# Patient Record
Sex: FEMALE | Race: WHITE | Employment: FULL TIME | URBAN - METROPOLITAN AREA
[De-identification: names, ages, dates, MRNs, and addresses within clinical notes are randomized per-mention and may not be internally consistent; named-entity substitution may affect disease eponyms.]

---

## 2017-02-14 ENCOUNTER — PATIENT OUTREACH (OUTPATIENT)
Dept: INTERNAL MEDICINE CLINIC | Age: 37
End: 2017-02-14

## 2017-02-15 ENCOUNTER — PATIENT OUTREACH (OUTPATIENT)
Dept: INTERNAL MEDICINE CLINIC | Age: 37
End: 2017-02-15

## 2017-02-15 NOTE — PROGRESS NOTES
NNTOCIP  Nurse Navigator second attempt to contact patient post hospitalization from [facility]; 2/4/2017 to 2/8/2017 for c/o vaginal bleeding. Left message for the patient to contact the office on the answering machine; letter sent.

## 2017-02-15 NOTE — LETTER
2/15/2017 9:28 AM 
 
Ms. Larry Starr [address] [facility] [address] I am a Nurse Navigator working with your physician's office. Part of my job is to follow up with patients who have been in the emergency department or hospital to see how they are feeling, answer any questions they may have about their visit and also make sure they have a follow-up appointment to see their primary care doctor. I can also provide resources to patients that have other needs. Please call me if you need assistance with affording food, medications, or if you need transportation to physician appointments. I can be reached Monday thru Friday at the telephone number listed above. Thank you for allowing us to participate in your care!  
 
 
 
 
Sincerely, 
 
 
Jeff Kaminski RN

## 2017-03-16 ENCOUNTER — PATIENT OUTREACH (OUTPATIENT)
Dept: INTERNAL MEDICINE CLINIC | Age: 37
End: 2017-03-16

## 2017-04-12 ENCOUNTER — OFFICE VISIT (OUTPATIENT)
Dept: SURGERY | Age: 37
End: 2017-04-12

## 2017-04-12 VITALS
DIASTOLIC BLOOD PRESSURE: 80 MMHG | HEART RATE: 84 BPM | SYSTOLIC BLOOD PRESSURE: 130 MMHG | BODY MASS INDEX: 36.99 KG/M2 | TEMPERATURE: 97.7 F | WEIGHT: 222 LBS | HEIGHT: 65 IN

## 2017-04-12 DIAGNOSIS — I10 ESSENTIAL HYPERTENSION: ICD-10-CM

## 2017-04-12 DIAGNOSIS — Z98.84 LAP-BAND SURGERY STATUS: ICD-10-CM

## 2017-04-12 RX ORDER — LABETALOL 200 MG/1
TABLET, FILM COATED ORAL 2 TIMES DAILY
COMMUNITY
End: 2017-05-26 | Stop reason: ALTCHOICE

## 2017-04-12 NOTE — MR AVS SNAPSHOT
Visit Information Date & Time Provider Department Dept. Phone Encounter #  
 4/12/2017 10:15 AM Marco Vera  [facility] [phone] [id number] Upcoming Health Maintenance Date Due  
 PAP AKA CERVICAL CYTOLOGY 1/14/2000 DTaP/Tdap/Td series (1 - Tdap) 8/10/2005 INFLUENZA AGE 9 TO ADULT 8/1/2016 Allergies as of 4/12/2017  Review Complete On: 7/15/2016 By: Jessica Platt LPN Severity Noted Reaction Type Reactions Latex  01/03/2012    Rash Adhesive  06/27/2014    Rash  
 Sulfa (Sulfonamide Antibiotics)    Unable to Obtain Current Immunizations  Reviewed on 9/1/2014 Name Date PPD 9/18/2000 TD Vaccine 8/9/2005 Not reviewed this visit Vitals BP Pulse Temp Height(growth percentile) Weight(growth percentile) BMI  
 130/80 84 97.7 °F (36.5 °C) 5' 5\" (1.651 m) 222 lb (100.7 kg) 36.94 kg/m2 OB Status Smoking Status Having regular periods Never Smoker BMI and BSA Data Body Mass Index Body Surface Area  
 36.94 kg/m 2 2.15 m 2 Preferred Pharmacy Pharmacy Name Phone [address] [place] [address] [address] [facility] [phone] Your Updated Medication List  
  
   
This list is accurate as of: 4/12/17 11:30 AM.  Always use your most recent med list.  
  
  
  
  
 ferrous sulfate 325 mg (65 mg iron) tablet Take  by mouth Daily (before breakfast). folic acid 936 mcg tablet Take 400 mcg by mouth daily. levothyroxine 112 mcg tablet Commonly known as:  SYNTHROID Take 1 tablet by mouth Daily (before breakfast). MULTIVITAMIN PO Take  by mouth. VITAMIN D3 PO Take  by mouth. Introducing [facility]! [address] [facility] introduces Caremerge patient portal. Now you can access parts of your medical record, email your doctor's office, and request medication refills online. 1. In your internet browser, go to [email or web address] 2. Click on the First Time User? Click Here link in the Sign In box. You will see the New Member Sign Up page. 3. Enter your Enevo Access Code exactly as it appears below. You will not need to use this code after youve completed the sign-up process. If you do not sign up before the expiration date, you must request a new code. · Enevo Access Code: [email or web address] Expires: 7/11/2017 10:19 AM 
 
4. Enter the last four digits of your Social Security Number (xxxx) and Date of Birth (mm/dd/yyyy) as indicated and click Submit. You will be taken to the next sign-up page. 5. Create a Enevo ID. This will be your Enevo login ID and cannot be changed, so think of one that is secure and easy to remember. 6. Create a Enevo password. You can change your password at any time. 7. Enter your Password Reset Question and Answer. This can be used at a later time if you forget your password. 8. Enter your e-mail address. You will receive e-mail notification when new information is available in [address] 9. Click Sign Up. You can now view and download portions of your medical record. 10. Click the Download Summary menu link to download a portable copy of your medical information. If you have questions, please visit the Frequently Asked Questions section of the Enevo website. Remember, Enevo is NOT to be used for urgent needs. For medical emergencies, dial 911. Now available from your iPhone and Android! Please provide this summary of care documentation to your next provider. Your primary care clinician is listed as Joe Gross. If you have any questions after today's visit, please call [phone].

## 2017-04-12 NOTE — PROGRESS NOTES
Patient presents for a band tighting of her 2009 lap band. She recently had a child via . Body mass index is 36.94 kg/(m^2).

## 2017-04-12 NOTE — PROGRESS NOTES
Vanessa Boyle is a 44 y.o. female is now 7 years status post laparoscopic adjustable gastric band surgery. Doing well overall. Currently on a solid meats/veggies diet without difficulty. She is compliant with multivitamins, calcium, Vit D and B12 supplements. She recently delivered healthy twins and has regained significant weight, exacerbating her HTN. Early in her pregnancy she was experiencing heartburn and had band completely deflated in order to reduce pressure on LES. She is now ready to have band re-inflated to the point where she was prior to pregnancy as she was losing weight and feeling adequate restriction. 4.1ccwas removed at time of deflation on 7/15  She has regained       Weight Loss Metrics 4/12/2017 7/15/2016 11/20/2015 9/2/2014 8/22/2014 6/27/2014 8/3/2012   Pre op / Initial Wt - - - - - - 240   Today's Wt 222 lb 173 lb 169 lb 197 lb 8 oz 194 lb 198 lb -   BMI 36.94 kg/m2 28.79 kg/m2 27.92 kg/m2 32.63 kg/m2 32.28 kg/m2 32.95 kg/m2 -   Ideal Body Wt - - - - - - 134   Excess Body Wt - - - - - - 106   Goal Wt - - - - - - 155   Wt loss to date - - - - - - 49   % Wt Loss - - - - - - 0.58   80% EBW - - - - - - 84.8       Body mass index is 36.94 kg/(m^2).       Comorbidities:    Hypertension: worsened  Diabetes: not applicable  Obstructive Sleep Apnea: not applicable  Hyperlipidemia: not applicable  Stress Urinary Incontinence: not applicable  Gastroesophageal Reflux: not applicable  Weight related arthropathy:not applicable        Past Medical History:   Diagnosis Date    Dyslipidemia     FHx: heart disease     Goiter 7/11    negative biopsy Dr Jaspal Fregoso Hypertension     Hypothyroidism     Dr Jenifer Howard in past    IFG (impaired fasting glucose) 9/1/2014    Iron deficiency anemia     from menorrhagia s/p iv fe multiple times Dr Kitty Brown obesity [facility])     s/p lap band 9/10 Dr Mariana Vazquez Polycystic ovary syndrome        Past Surgical History:   Procedure Laterality Date    ADJUSTMENT GASTRIC BAND  2009    Dr Sharmila Rangel, peak weight 251, renae 171    HX APPENDECTOMY      HX BREAST AUGMENTATION      HX CHOLECYSTECTOMY      HX DILATION AND CURETTAGE      HX DILATION AND CURETTAGE      HX GI  09/09/2010    Barium swallow, upper gastrointestinal    HX GI  06/28/2010    Lap Band placement    HX GYN      failed IVF x2 Dr Blayne Shelley       Current Outpatient Prescriptions   Medication Sig Dispense Refill    labetalol (NORMODYNE) 200 mg tablet Take  by mouth two (2) times a day.  CHOLECALCIFEROL, VITAMIN D3, (VITAMIN D3 PO) Take  by mouth.  ferrous sulfate 325 mg (65 mg iron) tablet Take  by mouth Daily (before breakfast).  levothyroxine (SYNTHROID) 112 mcg tablet Take 1 tablet by mouth Daily (before breakfast). 90 tablet 3    folic acid 646 mcg tablet Take 400 mcg by mouth daily.  MULTIVITAMIN PO Take  by mouth. Allergies   Allergen Reactions    Latex Rash    Adhesive Rash    Sulfa (Sulfonamide Antibiotics) Unable to Obtain       ROS:  Review of Systems   Constitutional: Positive for malaise/fatigue. All other systems reviewed and are negative. Physicial Exam:  Visit Vitals    /80    Pulse 84    Temp 97.7 °F (36.5 °C)    Ht 5' 5\" (1.651 m)    Wt 100.7 kg (222 lb)    BMI 36.94 kg/m2     Physical Exam   Constitutional: She is oriented to person, place, and time and well-developed, well-nourished, and in no distress. Pulmonary/Chest: Effort normal.   Abdominal: Soft. She exhibits no distension and no mass. There is no tenderness. There is no rebound and no guarding. Port palpated but tipped   Musculoskeletal: Normal range of motion. Neurological: She is alert and oriented to person, place, and time. Gait normal.   Skin: Skin is warm and dry.    Psychiatric: Mood, memory, affect and judgment normal.     *Attempted to access Lap-Band port in office with Rosenberg needle, however port tipped and inaccessible without direct visualization*    Assessment/Plan: Pt is currently 7 years s/p laparoscopic adjustable gastric band surgery with weight regain due to band deflation during twin gestation pregnancy. Port tipped and inaccessible in office, will need fluoroscopic band adjustment for access and to ensure no esophageal or pouch dilation/dysmotility with re-inflation. Stressed importance of hydration with SF clear liquids until urine clear. Cont with food content mainly meats/veggies. Encouraged support group attendance  Advised exercise program of 150 minutes per week  Counseling>50% of 30 minute visit    F/u after fluoroscopic adjustment, sooner as needed.   [address] [facility], PA-C

## 2017-04-13 ENCOUNTER — HOSPITAL ENCOUNTER (OUTPATIENT)
Dept: GENERAL RADIOLOGY | Age: 37
Discharge: HOME | End: 2017-04-13
Attending: PHYSICIAN ASSISTANT
Payer: COMMERCIAL

## 2017-04-13 ENCOUNTER — DOCUMENTATION ONLY (OUTPATIENT)
Dept: SURGERY | Age: 37
End: 2017-04-13

## 2017-04-13 DIAGNOSIS — I10 ESSENTIAL HYPERTENSION: ICD-10-CM

## 2017-04-13 DIAGNOSIS — Z98.84 LAP-BAND SURGERY STATUS: ICD-10-CM

## 2017-04-13 PROCEDURE — 76000 FLUOROSCOPY <1 HR PHYS/QHP: CPT

## 2017-04-13 PROCEDURE — 74011000255 HC RX REV CODE- 255: Performed by: PHYSICIAN ASSISTANT

## 2017-04-13 PROCEDURE — 74011636320 HC RX REV CODE- 636/320: Performed by: PHYSICIAN ASSISTANT

## 2017-04-13 RX ADMIN — BARIUM SULFATE 20 G: 960 POWDER, FOR SUSPENSION ORAL at 10:00

## 2017-04-13 RX ADMIN — DIATRIZOATE MEGLUMINE AND DIATRIZOATE SODIUM 30 ML: 600; 100 SOLUTION ORAL; RECTAL at 10:00

## 2017-04-13 NOTE — PROGRESS NOTES
Date of service: 4/13/17  Pt taken to fluoro at [facility] due to previous twin pregnancy and need for removal of 4.1ml in the office. She requested addition of saline to her Lap-Band and agreed to have this performed under fluoroscopic guidance for more accuracy in achieving restriction. In addition, port is tipped and unable to accessed in office. See radiology notes for DOS: 4/13/17      Gastric Band Adjustment Procedure    Port area was prepped with alcohol. Next, using sterile technique, the port was accessed using a lap band needle without difficulty, under radiologic assistance. Pt swallowed bolus of barium and esophageal peristalsis shown to be wnl. There was no dilation of esophagus or gastric pouch noted. Band outlet was slightly restricted at baseline. Saline was added in increments of 3ml followed by barium evaluation until moderate outlet restriction obtained. Previous Fill Volume: 2ml. Added: 3 ml--complete obstruction of band--removed 1cc-outlet snug but not obstructed  New Total: 4mL    Band-aid applied. The patient tolerated several gulps of water without difficulty. Patient understands that she should only drink clear liquids for the next 24-48 hours. She was instructed to remain in the waiting room for 30 min sipping water and if she developed difficulty to notify the staff. If she develops dysphagia, the patient will contact the office urgently.   F/u 4-6 weeks, sooner mingo Greer PA-C

## 2017-05-16 ENCOUNTER — OFFICE VISIT (OUTPATIENT)
Dept: SURGERY | Age: 37
End: 2017-05-16

## 2017-05-16 VITALS
RESPIRATION RATE: 17 BRPM | OXYGEN SATURATION: 99 % | TEMPERATURE: 98.1 F | DIASTOLIC BLOOD PRESSURE: 88 MMHG | HEART RATE: 85 BPM | SYSTOLIC BLOOD PRESSURE: 128 MMHG | HEIGHT: 65 IN | BODY MASS INDEX: 37.99 KG/M2 | WEIGHT: 228 LBS

## 2017-05-16 DIAGNOSIS — Z98.84 LAP-BAND SURGERY STATUS: ICD-10-CM

## 2017-05-26 NOTE — PROGRESS NOTES
Pt returns for f/u of recent Lap-Band adjustment performed under fluoroscopy to access tipped port and refill fluid after pregnancy. Louie Beatty was 4/12/17 and 4cc in place currently. Pt reports feeling no restriction, and has gained 6lbs since adjustment. She is frustrated and desires another fill. She denies any sx of band obstruction. Past Medical History:   Diagnosis Date    Dyslipidemia     FHx: heart disease     Goiter 7/11    negative biopsy Dr Michael Mcbride Hypertension     Hypothyroidism     Dr Ocampo Bowels in past    IFG (impaired fasting glucose) 9/1/2014    Iron deficiency anemia     from menorrhagia s/p iv fe multiple times Dr Eastman Bolds obesity [facility])     s/p lap band 9/10 Dr Sam Ziegler Polycystic ovary syndrome      Current Outpatient Prescriptions on File Prior to Visit   Medication Sig Dispense Refill    CHOLECALCIFEROL, VITAMIN D3, (VITAMIN D3 PO) Take  by mouth.  ferrous sulfate 325 mg (65 mg iron) tablet Take  by mouth Daily (before breakfast).  levothyroxine (SYNTHROID) 112 mcg tablet Take 1 tablet by mouth Daily (before breakfast). 90 tablet 3    folic acid 655 mcg tablet Take 400 mcg by mouth daily.  MULTIVITAMIN PO Take  by mouth. No current facility-administered medications on file prior to visit. Weight Metrics 5/16/2017 4/12/2017 7/15/2016 11/20/2015 9/2/2014 8/22/2014 6/27/2014   Weight 228 lb 222 lb 173 lb 169 lb 197 lb 8 oz 194 lb 198 lb   BMI 37.94 kg/m2 36.94 kg/m2 28.79 kg/m2 27.92 kg/m2 32.63 kg/m2 32.28 kg/m2 32.95 kg/m2     Visit Vitals    /88 (BP 1 Location: Left arm, BP Patient Position: Sitting)    Pulse 85    Temp 98.1 °F (36.7 °C) (Oral)    Resp 17    Ht 5' 5\" (1.651 m)    Wt 103.4 kg (228 lb)    SpO2 99%    BMI 37.94 kg/m2     Appears well    A/P: Lap-Band s/p recent adjustment--despite fluoro image looking snug, pt not feeling adequate restriction and has further weight gain.  Agree to perform another adjustment under fluoro for fine tuning of band, as easily obstructed band during last adjustment.  Reiterated meats/veggies diet  Will arrange  Counseling>50% of 15 minute visit  F/u after next fill  Abbey Greer PA-C

## 2017-07-11 ENCOUNTER — TELEPHONE (OUTPATIENT)
Dept: SURGERY | Age: 37
End: 2017-07-11

## 2017-07-11 NOTE — TELEPHONE ENCOUNTER
Patient wants to schedule a lap band fill under fluoro. She would like to talk to you first about this.

## 2018-06-07 ENCOUNTER — OFFICE VISIT (OUTPATIENT)
Dept: SURGERY | Age: 38
End: 2018-06-07

## 2018-06-07 VITALS
OXYGEN SATURATION: 98 % | SYSTOLIC BLOOD PRESSURE: 148 MMHG | BODY MASS INDEX: 37.39 KG/M2 | WEIGHT: 224.4 LBS | TEMPERATURE: 98.1 F | HEIGHT: 65 IN | RESPIRATION RATE: 16 BRPM | DIASTOLIC BLOOD PRESSURE: 101 MMHG | HEART RATE: 88 BPM

## 2018-06-07 DIAGNOSIS — Z98.84 LAP-BAND SURGERY STATUS: Primary | ICD-10-CM

## 2018-06-07 PROBLEM — E66.01 SEVERE OBESITY (BMI 35.0-39.9): Status: ACTIVE | Noted: 2018-06-07

## 2018-06-07 RX ORDER — HYDROCHLOROTHIAZIDE 12.5 MG/1
12.5 TABLET ORAL DAILY
COMMUNITY

## 2018-06-07 NOTE — PROGRESS NOTES
1. Have you been to the ER, urgent care clinic since your last visit? Hospitalized since your last visit? No    2. Have you seen or consulted any other health care providers outside of the [address] [place] [address] since your last visit? Include any pap smears or colon screening. No     Patient presents today to see about having her lap band adjusted.

## 2018-06-07 NOTE — PROGRESS NOTES
Subjective:      Luz Maria Mills is a 43 y.o. female is now 9 years status post laparoscopic adjustable gastric band surgery. Doing well overall. Had all fluid removed in 2016 (4.1mL) when pregnant with twins due to dysphagia. Since then has not had much restriction. Had band fill under flouro by DARIUS MCDONOUGH 2017 (4.0mL), but notes she has not been well restricted since that time. She is eating well and exercising, but has regained weight from her renae of 169 to todays weight of 224. She would like a band fill. Weight Loss Metrics 2018 2017 2017 7/15/2016 2015 2014 2014   Pre op / Initial Wt - - - - - - -   Today's Wt 224 lb 6.4 oz 228 lb 222 lb 173 lb 169 lb 197 lb 8 oz 194 lb   BMI 37.34 kg/m2 37.94 kg/m2 36.94 kg/m2 28.79 kg/m2 27.92 kg/m2 32.63 kg/m2 32.28 kg/m2   Ideal Body Wt - - - - - - -   Excess Body Wt - - - - - - -   Goal Wt - - - - - - -   Wt loss to date - - - - - - -   % Wt Loss - - - - - - -   80% EBW - - - - - - -       Body mass index is 37.34 kg/(m^2).     Comorbidities:    Hypertension: not applicable  Diabetes: not applicable  Obstructive Sleep Apnea: resolved  Hyperlipidemia: not applicable  Stress Urinary Incontinence: not applicable  Gastroesophageal Reflux: not applicable  Weight related arthropathy:not applicable        Past Medical History:   Diagnosis Date    Dyslipidemia     FHx: heart disease     Goiter     negative biopsy Dr Diego Gonzales Hypertension     Hypothyroidism     Dr Brenda Montilla in past    IFG (impaired fasting glucose) 2014    Iron deficiency anemia     from menorrhagia s/p iv fe multiple times Dr Flakito Sanon obesity [facility])     s/p lap band 9/10 Dr Ch[facility] Polycystic ovary syndrome        Past Surgical History:   Procedure Laterality Date    ADJUSTMENT GASTRIC BAND      Dr Abhi Rosales, peak weight 251, renae 171    DELIVERY   2017    HX APPENDECTOMY      HX BREAST AUGMENTATION      HX CHOLECYSTECTOMY      HX DILATION AND CURETTAGE      HX DILATION AND CURETTAGE      HX GI  09/09/2010    Barium swallow, upper gastrointestinal    HX GI  06/28/2010    Lap Band placement    HX GYN      failed IVF x2 Dr Basilia Lorenzo       Current Outpatient Prescriptions   Medication Sig Dispense Refill    hydroCHLOROthiazide (HYDRODIURIL) 12.5 mg tablet Take 12.5 mg by mouth daily.  CHOLECALCIFEROL, VITAMIN D3, (VITAMIN D3 PO) Take  by mouth.  levothyroxine (SYNTHROID) 112 mcg tablet Take 1 tablet by mouth Daily (before breakfast). 90 tablet 3    MULTIVITAMIN PO Take  by mouth.  ferrous sulfate 325 mg (65 mg iron) tablet Take  by mouth Daily (before breakfast).  folic acid 568 mcg tablet Take 400 mcg by mouth daily. Allergies   Allergen Reactions    Latex Rash    Adhesive Rash    Sulfa (Sulfonamide Antibiotics) Unable to Obtain         Objective:     Visit Vitals    BP (!) 148/101 (BP 1 Location: Right arm, BP Patient Position: Sitting)    Pulse 88    Temp 98.1 °F (36.7 °C) (Oral)    Resp 16    Ht 5' 5\" (1.651 m)    Wt 101.8 kg (224 lb 6.4 oz)    SpO2 98%    BMI 37.34 kg/m2       General:  alert, cooperative, no distress, appears stated age   Chest: lungs clear to auscultation, breath sounds equal and symmetric, no accessory muscle use   Cor:   Regular rate and rhythm   Abdomen: soft, bowel sounds active, non-tender   Incisions:   healing well, no drainage, no erythema, no hernia, no seroma, no swelling, no dehiscence, incision well approximated       Labs: none new    Assessment:     Doing well postoperatively. Would benefit from band fill as is not well restricted, however, will do under flouro as she has has a difficult port to access and she was apparently near obstructed at 4.1 but not restricted at 4.0. Doing it under flouro will allow radiographic confirmation/correlation of findings.     Plan:   -schedule band adjustment under flouro

## 2018-06-07 NOTE — PATIENT INSTRUCTIONS
If you have any questions or concerns about today's appointment, the verbal and/or written instructions you were given for follow up care, please call our office at [phone].     [facility] - [address]    [phone] office  [phone]

## 2018-06-08 DIAGNOSIS — Z98.84 LAP-BAND SURGERY STATUS: Primary | ICD-10-CM

## 2018-06-08 DIAGNOSIS — R63.5 WEIGHT GAIN: ICD-10-CM

## 2018-06-28 ENCOUNTER — HOSPITAL ENCOUNTER (OUTPATIENT)
Dept: GENERAL RADIOLOGY | Age: 38
Discharge: HOME | End: 2018-06-28
Attending: NURSE PRACTITIONER
Payer: SELF-PAY

## 2018-06-28 DIAGNOSIS — R63.5 WEIGHT GAIN: ICD-10-CM

## 2018-06-28 DIAGNOSIS — Z98.84 LAP-BAND SURGERY STATUS: ICD-10-CM

## 2018-06-28 PROCEDURE — 76000 FLUOROSCOPY <1 HR PHYS/QHP: CPT

## 2018-06-28 PROCEDURE — 74011636320 HC RX REV CODE- 636/320: Performed by: SURGERY

## 2018-06-28 RX ADMIN — IOHEXOL 50 ML: 240 INJECTION, SOLUTION INTRATHECAL; INTRAVASCULAR; INTRAVENOUS; ORAL at 07:53

## 2018-06-28 RX ADMIN — IOHEXOL 50 ML: 240 INJECTION, SOLUTION INTRATHECAL; INTRAVASCULAR; INTRAVENOUS; ORAL at 07:56

## 2018-06-28 NOTE — PROCEDURES
[facility]  OFFICE PROCEDURE PROGRESS NOTE        Chart reviewed for the following:   Amada Mueller MD, have reviewed the History, Physical and updated the Allergic reactions for [address] performed immediately prior to start of procedure:   Amada Mueller MD, have performed the following reviews on [facility] prior to the start of the procedure:            * Patient was identified by name and date of birth   * Agreement on procedure being performed was verified  * Risks and Benefits explained to the patient  * Procedure site verified and marked as necessary  * Patient was positioned for comfort  * Consent was signed and verified     Time: 0732      Date of procedure: 6/28/2018    Procedure performed by:  [facility] DX RM 6    Provider assisted by: Olu Chapman, radiology assistant     Patient assisted by: self    How tolerated by patient: tolerated the procedure well with no complications    Post Procedural Pain Scale: 0 - No Hurt    Comments: none            Gastric Band Adjustment Procedure    Port area was prepped with alcohol . Flouroscopy was used to locate port. Next, using sterile technique, the port was accessed using a lap band needle without difficulty. UGI was performed and patient noted to be fairly well restricted. Band fill volume assessed at 3.0mL. We then added 0.5mL and repeated the UGI. There was adequate restriction and all contrast was noted to empty in a timely fashion. Scant reflux. Patient noted feeling appropriately restricted. Previous Fill Volume:  3.0ml. Added: 0.5 ml  Removed: 0.0 ml  New Total: 3.5mL    Band-aid applied. The patient tolerated several gulps of water without difficulty. Patient understands that they should only drink clear liquids for the next 24-48 hours.  She was instructed to remain in the waiting room for 30 min sipping water and if she developed difficulty to notify the staff. If develops dysphagia, patient will contact the office urgently.

## 2018-07-20 ENCOUNTER — DOCUMENTATION ONLY (OUTPATIENT)
Dept: SURGERY | Age: 38
End: 2018-07-20

## 2018-07-20 NOTE — PROGRESS NOTES
Note: patient's lap band is best accessed from medial aspect of port scar, 1.5cm from lateral margin and 1.5cm cephalad from that point.

## 2018-12-09 ENCOUNTER — APPOINTMENT (OUTPATIENT)
Dept: GENERAL RADIOLOGY | Age: 38
End: 2018-12-09
Attending: PHYSICIAN ASSISTANT
Payer: COMMERCIAL

## 2018-12-09 ENCOUNTER — HOSPITAL ENCOUNTER (EMERGENCY)
Age: 38
Discharge: HOME | End: 2018-12-09
Attending: EMERGENCY MEDICINE
Payer: COMMERCIAL

## 2018-12-09 VITALS
DIASTOLIC BLOOD PRESSURE: 96 MMHG | HEART RATE: 87 BPM | OXYGEN SATURATION: 99 % | TEMPERATURE: 98.1 F | WEIGHT: 210 LBS | SYSTOLIC BLOOD PRESSURE: 144 MMHG | BODY MASS INDEX: 34.99 KG/M2 | RESPIRATION RATE: 18 BRPM | HEIGHT: 65 IN

## 2018-12-09 DIAGNOSIS — S61.209A AVULSION OF FINGER TIP, INITIAL ENCOUNTER: ICD-10-CM

## 2018-12-09 DIAGNOSIS — M79.642 LEFT HAND PAIN: ICD-10-CM

## 2018-12-09 DIAGNOSIS — S62.637B OPEN DISPLACED FRACTURE OF DISTAL PHALANX OF LEFT LITTLE FINGER, INITIAL ENCOUNTER: Primary | ICD-10-CM

## 2018-12-09 DIAGNOSIS — S69.92XA HAND INJURY, LEFT, INITIAL ENCOUNTER: ICD-10-CM

## 2018-12-09 PROCEDURE — 74011000272 HC RX REV CODE- 272

## 2018-12-09 PROCEDURE — 99283 EMERGENCY DEPT VISIT LOW MDM: CPT

## 2018-12-09 PROCEDURE — 75810000293 HC SIMP/SUPERF WND  RPR

## 2018-12-09 PROCEDURE — 96374 THER/PROPH/DIAG INJ IV PUSH: CPT

## 2018-12-09 PROCEDURE — 74011250636 HC RX REV CODE- 250/636: Performed by: PHYSICIAN ASSISTANT

## 2018-12-09 PROCEDURE — 74011000272 HC RX REV CODE- 272: Performed by: PHYSICIAN ASSISTANT

## 2018-12-09 PROCEDURE — 74011250637 HC RX REV CODE- 250/637: Performed by: PHYSICIAN ASSISTANT

## 2018-12-09 PROCEDURE — 74011000250 HC RX REV CODE- 250: Performed by: PHYSICIAN ASSISTANT

## 2018-12-09 PROCEDURE — 73130 X-RAY EXAM OF HAND: CPT

## 2018-12-09 RX ORDER — FLUCONAZOLE 150 MG/1
150 TABLET ORAL
Qty: 1 TAB | Refills: 2 | Status: SHIPPED | OUTPATIENT
Start: 2018-12-09 | End: 2018-12-09

## 2018-12-09 RX ORDER — OXYCODONE AND ACETAMINOPHEN 5; 325 MG/1; MG/1
1 TABLET ORAL
Qty: 10 TAB | Refills: 0 | Status: SHIPPED | OUTPATIENT
Start: 2018-12-09

## 2018-12-09 RX ORDER — OXYCODONE AND ACETAMINOPHEN 5; 325 MG/1; MG/1
1 TABLET ORAL
Status: COMPLETED | OUTPATIENT
Start: 2018-12-09 | End: 2018-12-09

## 2018-12-09 RX ORDER — CEFAZOLIN SODIUM 1 G/3ML
1 INJECTION, POWDER, FOR SOLUTION INTRAMUSCULAR; INTRAVENOUS
Status: COMPLETED | OUTPATIENT
Start: 2018-12-09 | End: 2018-12-09

## 2018-12-09 RX ORDER — CEPHALEXIN 500 MG/1
500 CAPSULE ORAL 4 TIMES DAILY
Qty: 28 CAP | Refills: 0 | Status: SHIPPED | OUTPATIENT
Start: 2018-12-09 | End: 2018-12-16

## 2018-12-09 RX ADMIN — Medication: at 11:19

## 2018-12-09 RX ADMIN — CEFAZOLIN 1 G: 1 INJECTION, POWDER, FOR SOLUTION INTRAMUSCULAR; INTRAVENOUS; PARENTERAL at 11:19

## 2018-12-09 RX ADMIN — GELATIN ABSORBABLE SPONGE 12-7 MM: 12-7 MISC at 11:36

## 2018-12-09 RX ADMIN — THROMBIN, TOPICAL (BOVINE) 5000 UNITS: KIT at 11:30

## 2018-12-09 RX ADMIN — OXYCODONE AND ACETAMINOPHEN 1 TABLET: 5; 325 TABLET ORAL at 10:31

## 2018-12-09 NOTE — DISCHARGE INSTRUCTIONS
Finger Fracture: Care Instructions  Your Care Instructions    Breaks in the bones of the finger usually heal well in about 3 to 4 weeks. The pain and swelling from a broken finger can last for weeks. But it should steadily improve, starting a few days after you break it. It is very important that you wear and take care of the cast or splint exactly as your doctor tells you to so that your finger heals properly and does not end up crooked. Wearing a splint may interfere with your normal activities. Ask for help with daily tasks if you need it. You heal best when you take good care of yourself. Eat a variety of healthy foods, and don't smoke. Follow-up care is a key part of your treatment and safety. Be sure to make and go to all appointments, and call your doctor if you are having problems. It's also a good idea to know your test results and keep a list of the medicines you take. How can you care for yourself at home? · If your doctor put a splint on your finger, wear the splint exactly as directed. Do not remove it until your doctor says that you can. · Keep your hand raised above the level of your heart as much as you can. This will help reduce swelling. · Put ice or a cold pack on your finger for 10 to 20 minutes at a time. Try to do this every 1 to 2 hours for the next 3 days (when you are awake) or until the swelling goes down. Put a thin cloth between the ice and your skin. Keep the splint dry. · Be safe with medicines. Take pain medicines exactly as directed. ? If the doctor gave you a prescription medicine for pain, take it as prescribed. ? If you are not taking a prescription pain medicine, ask your doctor if you can take an over-the-counter medicine. When should you call for help? Call 911 anytime you think you may need emergency care.  For example, call if:    · Your finger is cool or pale or changes color.    Call your doctor now or seek immediate medical care if:    · Your pain gets much worse.     · You have tingling, weakness, or numbness in your finger.     · You have signs of infection, such as:  ? Increased pain, swelling, warmth, or redness. ? Red streaks leading from the area. ? Pus draining from the area. ? Swollen lymph nodes in your neck, armpits, or groin. ? A fever.    Watch closely for changes in your health, and be sure to contact your doctor if:    · Your finger is not steadily improving. Where can you learn more? Go to http://erickson-denny.info/. Enter Z179 in the search box to learn more about \"Finger Fracture: Care Instructions. \"  Current as of: November 29, 2017  Content Version: 11.8  © 2422-3713 [employer]. Care instructions adapted under license by [employer] (which disclaims liability or warranty for this information). If you have questions about a medical condition or this instruction, always ask your healthcare professional. [place] [address] any warranty or liability for your use of this information. Cuts Left Open: Care Instructions  Your Care Instructions    A cut can happen anywhere on your body. Sometimes a cut can injure the tendons, blood vessels, or nerves. A cut may be left open instead of being closed with stitches, staples, or adhesive. A cut may be left open when it is likely to become infected, because closing it can make infection even more likely. You will probably have a bandage. The doctor may want the cut to stay open the whole time it heals. This happens with some cuts when too much time has gone by since the cut happened. Or the doctor may tell you to come back to have the cut closed in 4 to 5 days, when there is less chance of infection. If the cut stays open while healing, your scar may be larger than if the cut was closed. But you can get treatment later to make the scar smaller. The doctor has checked you carefully, but problems can develop later.  If you notice any problems or new symptoms, get medical treatment right away. Follow-up care is a key part of your treatment and safety. Be sure to make and go to all appointments, and call your doctor if you are having problems. It's also a good idea to know your test results and keep a list of the medicines you take. How can you care for yourself at home? · Keep the cut dry for the first 24 to 48 hours. After this, you can shower if your doctor okays it. Pat the cut dry. · Don't soak the cut, such as in a bathtub. Your doctor will tell you when it's safe to get the cut wet. · If your doctor told you how to care for your cut, follow your doctor's instructions. If you did not get instructions, follow this general advice:  ? After the first 24 to 48 hours, wash the cut with clean water 2 times a day. Don't use hydrogen peroxide or alcohol, which can slow healing. ? You may cover the cut with a thin layer of petroleum jelly, such as Vaseline, and a nonstick bandage. ? Apply more petroleum jelly and replace the bandage as needed. · Prop up the injured area on a pillow anytime you sit or lie down during the next 3 days. Try to keep it above the level of your heart. This will help reduce swelling. · Avoid any activity that could cause your cut to get worse. · Take pain medicines exactly as directed. ? If the doctor gave you a prescription medicine for pain, take it as prescribed. ? If you are not taking a prescription pain medicine, ask your doctor if you can take an over-the-counter medicine. When should you call for help? Call your doctor now or seek immediate medical care if:    · You have new pain, or your pain gets worse.     · The cut starts to bleed, and blood soaks through the bandage.  Oozing small amounts of blood is normal.     · The skin near the cut is cold or pale or changes color.     · You have tingling, weakness, or numbness near the cut.     · You have trouble moving the area near the cut.     · You have symptoms of infection, such as:  ? Increased pain, swelling, warmth, or redness around the cut.  ? Red streaks leading from the cut.  ? Pus draining from the cut.  ? A fever.    Watch closely for changes in your health, and be sure to contact your doctor if:    · The cut is not closing (getting smaller).     · You do not get better as expected. Where can you learn more? Go to http://erickson-denny.info/. Enter [id number] in the search box to learn more about \"Cuts Left Open: Care Instructions. \"  Current as of: November 20, 2017  Content Version: 11.8  © 0830-7495 [employer]. Care instructions adapted under license by [employer] (which disclaims liability or warranty for this information). If you have questions about a medical condition or this instruction, always ask your healthcare professional. [address] any warranty or liability for your use of this information.

## 2018-12-09 NOTE — ED PROVIDER NOTES
EMERGENCY DEPARTMENT HISTORY AND PHYSICAL EXAM 
 
Date: 12/9/2018 Patient Name: Citlaly Ramires History of Presenting Illness Chief Complaint Patient presents with  Finger Pain  Laceration History Provided By: Patient Chief Complaint: Left pinky finger injury, pain and laceration Duration: PTA Timing:  Acute Location: distal left pinky Quality: Throbbing Severity: Moderate Modifying Factors: Pain is worse with movement Associated Symptoms: none Additional History (Context): Citlaly Ramires is a 43 y.o. female with a history of hypothyroidism, HTN, obesity and anemia who presents with a left little finger injury. Patient reports she was helping her  move furniture when a piece of furniture fell on her left hand. She reports \"it looked like hamburger\". Reports last tetanus was 3 years ago. Denies any pain meds prior to arrival. Denies any allergies. Denies decreased movement or loss of sensation. PCP: Elieser Rosas MD 
 
Current Outpatient Medications Medication Sig Dispense Refill  oxyCODONE-acetaminophen (PERCOCET) 5-325 mg per tablet Take 1 Tab by mouth every four (4) hours as needed for Pain. Max Daily Amount: 6 Tabs. 10 Tab 0  cephALEXin (KEFLEX) 500 mg capsule Take 1 Cap by mouth four (4) times daily for 7 days. 28 Cap 0  
 fluconazole (DIFLUCAN) 150 mg tablet Take 1 Tab by mouth now for 1 dose. FDA advises cautious prescribing of oral fluconazole in pregnancy. 1 Tab 2  
 hydroCHLOROthiazide (HYDRODIURIL) 12.5 mg tablet Take 12.5 mg by mouth daily.  levothyroxine (SYNTHROID) 112 mcg tablet Take 1 tablet by mouth Daily (before breakfast). 90 tablet 3  
 MULTIVITAMIN PO Take  by mouth.  CHOLECALCIFEROL, VITAMIN D3, (VITAMIN D3 PO) Take  by mouth. Past History Past Medical History: 
Past Medical History:  
Diagnosis Date  Dyslipidemia  FHx: heart disease  Goiter 7/11  
 negative biopsy Dr Gianfranco Johnson  Hypertension  Hypothyroidism [facility] in past  
 IFG (impaired fasting glucose) 2014  Iron deficiency anemia   
 from menorrhagia s/p iv fe multiple times Dr Román Boyer  Morbid obesity ([address]   
 s/p lap band 9/10 Dr Susan Dewitt  Polycystic ovary syndrome Past Surgical History: 
Past Surgical History:  
Procedure Laterality Date 425  [facility] GASTRIC BAND   Dr Susan Dewitt, peak weight 251, renae 171  DELIVERY   2017  HX APPENDECTOMY  HX BREAST AUGMENTATION    
 HX CHOLECYSTECTOMY  HX DILATION AND CURETTAGE    
 HX DILATION AND CURETTAGE    
 HX GI  2010 Barium swallow, upper gastrointestinal  
 HX GI  2010 Lap Band placement  HX GYN    
 failed IVF x2 Dr Nelson Branham Family History: 
Family History Problem Relation Age of Onset  Heart Disease Father  Diabetes Mother Social History: 
Social History Tobacco Use  Smoking status: Never Smoker  Smokeless tobacco: Never Used Substance Use Topics  Alcohol use: Yes Comment: RARELY  Drug use: No  
 
 
Allergies: Allergies Allergen Reactions  Latex Rash  Adhesive Rash  Sulfa (Sulfonamide Antibiotics) Unable to Obtain Review of Systems Review of Systems Constitutional: Negative for chills and fever. HENT: Negative for congestion, rhinorrhea and sore throat. Respiratory: Negative for cough and shortness of breath. Cardiovascular: Negative for chest pain. Gastrointestinal: Negative for abdominal pain, blood in stool, constipation, diarrhea, nausea and vomiting. Genitourinary: Negative for dysuria, frequency and hematuria. Musculoskeletal: Negative for back pain and myalgias. Skin: Positive for wound. Negative for rash. Neurological: Negative for dizziness and headaches. All other systems reviewed and are negative. All Other Systems Negative Physical Exam  
 
Vitals:  
 18 0408 BP: (!) 144/96 Pulse: 87 Resp: 18 Temp: 98.1 °F (36.7 °C) SpO2: 99% Weight: 95.3 kg (210 lb) Height: 5' 5\" (1.651 m) Physical Exam  
Constitutional: She is oriented to person, place, and time. She appears well-developed and well-nourished. No distress. HENT:  
Head: Normocephalic and atraumatic. Eyes: Conjunctivae are normal.  
Neck: Normal range of motion. Neck supple. Cardiovascular: Normal rate, regular rhythm and normal heart sounds. Pulmonary/Chest: Effort normal and breath sounds normal. No respiratory distress. She exhibits no tenderness. Abdominal: Soft. Bowel sounds are normal. She exhibits no distension. There is no tenderness. There is no rebound and no guarding. Musculoskeletal: She exhibits no edema or deformity. Neurological: She is alert and oriented to person, place, and time. Skin: Skin is warm and dry. Laceration noted. She is not diaphoretic. Distal Left pinky finger laceration with tip avulsion and nail bed involvement Psychiatric: She has a normal mood and affect. Her behavior is normal.  
Nursing note and vitals reviewed. Diagnostic Study Results Labs - No results found for this or any previous visit (from the past 12 hour(s)). Radiologic Studies -  
XR HAND LT MIN 3 V Final Result IMPRESSION:   
  
1. Abnormalities at the distal aspect of the fifth digit as above. Uncertain  
chronicity. CT Results  (Last 48 hours) None CXR Results  (Last 48 hours) None Medical Decision Making I am the first provider for this patient. I reviewed the vital signs, available nursing notes, past medical history, past surgical history, family history and social history. Vital Signs-Reviewed the patient's vital signs. Pulse Oximetry Analysis -  100 % RA Records Reviewed: Nursing Notes and Old Medical Records Procedures: None Wound Repair 
Date/Time: 12/9/2018 1:07 PM 
 Performed by: PAPreparation: skin prepped with Betadine Location details: left small finger Wound length: Distal finger tip avulsion Anesthesia: digital block Anesthesia: 
Local Anesthetic: lidocaine 1% without epinephrine Anesthetic total: 2 mL Foreign bodies: no foreign bodies Irrigation method: syringe Wound skin closure material used: Gelfoam placed. Patient tolerance: Patient tolerated the procedure well with no immediate complications My total time at bedside, performing this procedure was 31-45 minutes. Provider Notes (Medical Decision Making):  
 
 
Differential: fracture, dislocation, abrasion, sprain, contusion, laceration Plan: Will order pain meds and xray. Will do digital block, clean and irrigate wound, will repair wound 1:06 PM 
2 grams of Ancef given, wound repaired and clean vigorously, will discharge home with pain meds, and abx. Have stressed the need to follow up with Hand/ortho in one day. Patient agrees with the plan and management and states all questions have been thoroughly answered and there are no more remaining questions. MED RECONCILIATION: 
No current facility-administered medications for this encounter. Current Outpatient Medications Medication Sig  
 oxyCODONE-acetaminophen (PERCOCET) 5-325 mg per tablet Take 1 Tab by mouth every four (4) hours as needed for Pain. Max Daily Amount: 6 Tabs.  cephALEXin (KEFLEX) 500 mg capsule Take 1 Cap by mouth four (4) times daily for 7 days.  fluconazole (DIFLUCAN) 150 mg tablet Take 1 Tab by mouth now for 1 dose. FDA advises cautious prescribing of oral fluconazole in pregnancy.  hydroCHLOROthiazide (HYDRODIURIL) 12.5 mg tablet Take 12.5 mg by mouth daily.  levothyroxine (SYNTHROID) 112 mcg tablet Take 1 tablet by mouth Daily (before breakfast).  MULTIVITAMIN PO Take  by mouth.  CHOLECALCIFEROL, VITAMIN D3, (VITAMIN D3 PO) Take  by mouth. Disposition: 
Home DISCHARGE NOTE:  
 Pt has been reexamined. Patient has no new complaints, changes, or physical findings. Care plan outlined and precautions discussed. Results of workup were reviewed with the patient. All medications were reviewed with the patient. All of pt's questions and concerns were addressed. Patient was instructed and agrees to follow up with Ortho/Hand, as well as to return to the ED upon further deterioration. Patient is ready to go home. Follow-up Information Follow up With Specialties Details Why Contact Info [id number] [facility] EMERGENCY DEPT Emergency Medicine  As needed [place] [address] [address] [id number] [phone] Landon Alvarez DO Hand Surgery Schedule an appointment as soon as possible for a visit  [place] [address] [address] [facility] [address] 
[phone] Gloria Guerra MD Orthopedic Surgery Schedule an appointment as soon as possible for a visit  [place] [address] [address] [facility] Aston rogders [address] [facility] [address] 
[phone] Current Discharge Medication List  
  
START taking these medications Details  
oxyCODONE-acetaminophen (PERCOCET) 5-325 mg per tablet Take 1 Tab by mouth every four (4) hours as needed for Pain. Max Daily Amount: 6 Tabs. Qty: 10 Tab, Refills: 0 Associated Diagnoses: Open displaced fracture of distal phalanx of left little finger, initial encounter  
  
cephALEXin (KEFLEX) 500 mg capsule Take 1 Cap by mouth four (4) times daily for 7 days. Qty: 28 Cap, Refills: 0 Associated Diagnoses: Open displaced fracture of distal phalanx of left little finger, initial encounter  
  
fluconazole (DIFLUCAN) 150 mg tablet Take 1 Tab by mouth now for 1 dose. FDA advises cautious prescribing of oral fluconazole in pregnancy. Qty: 1 Tab, Refills: 2 Associated Diagnoses: Open displaced fracture of distal phalanx of left little finger, initial encounter Diagnosis Clinical Impression: 1. Open displaced fracture of distal phalanx of left little finger, initial encounter 2. Left hand pain 3. Hand injury, left, initial encounter 4. Avulsion of finger tip, initial encounter

## 2018-12-09 NOTE — LETTER
[address] [place] [address] Dr [facility] EMERGENCY DEPT 
[address] [facility] [id number] [phone] Work/School Note Date: 12/9/2018 To Whom It May concern: 
 
Mame Malone was seen and treated today in the emergency room by the following provider(s): 
Attending Provider: Ben Davila MD 
Physician Assistant: Guilherme Gregorio. Mame Malone may return to work on 12/11/18 Sincerely, Rufus Middleton, Guilherme Mcclure

## 2018-12-11 ENCOUNTER — OFFICE VISIT (OUTPATIENT)
Dept: ORTHOPEDIC SURGERY | Facility: CLINIC | Age: 38
End: 2018-12-11

## 2018-12-11 VITALS
HEIGHT: 65 IN | SYSTOLIC BLOOD PRESSURE: 148 MMHG | DIASTOLIC BLOOD PRESSURE: 105 MMHG | OXYGEN SATURATION: 98 % | WEIGHT: 220.6 LBS | TEMPERATURE: 98.4 F | HEART RATE: 70 BPM | BODY MASS INDEX: 36.75 KG/M2 | RESPIRATION RATE: 18 BRPM

## 2018-12-11 DIAGNOSIS — S68.119A FINGERTIP AMPUTATION, INITIAL ENCOUNTER: Primary | ICD-10-CM

## 2018-12-11 DIAGNOSIS — S61.217A LACERATION OF LEFT LITTLE FINGER, FOREIGN BODY PRESENCE UNSPECIFIED, NAIL DAMAGE STATUS UNSPECIFIED, INITIAL ENCOUNTER: ICD-10-CM

## 2018-12-11 RX ORDER — IBUPROFEN 600 MG/1
TABLET ORAL
COMMUNITY

## 2018-12-11 NOTE — PATIENT INSTRUCTIONS
Fingertip Amputation: Care Instructions Your Care Instructions Fingertip amputation is a common injury. Treatment depends on how much skin, tissue, bone, and nail were damaged and how much of your finger or thumb was cut off. The doctor may have put stitches in your finger. You may need to see a hand surgeon for more treatment. Your fingertips have many nerves and are very sensitive, so the injury may be very painful. Recovery can take several weeks. Your finger may be sensitive to cold and painful for a year or more. You probably will have a splint to protect your finger as it heals. It is very important that you wear the splint exactly as your doctor tells you. Wearing a splint may interfere with your normal activities. Ask for help with daily tasks if you need it. The doctor has checked you carefully, but problems can develop later. If you notice any problems or new symptoms, get medical treatment right away. Follow-up care is a key part of your treatment and safety. Be sure to make and go to all appointments, and call your doctor if you are having problems. It's also a good idea to know your test results and keep a list of the medicines you take. How can you care for yourself at home? · If your doctor put a splint on your finger, wear the splint exactly as directed. Keep the splint dry. Do not remove it until your doctor says you can. · Keep the cut dry for the first 24 to 48 hours. After this, you can shower if your doctor says it is okay. Pat the cut dry. · Don't soak the cut, such as in a bathtub. Your doctor will tell you when it's safe to get the cut wet. · If your doctor told you how to care for your cut, follow your doctor's instructions. If you did not get instructions, follow this general advice: ? After the first 24 to 48 hours, wash around the cut with clean water 2 times a day. Don't use hydrogen peroxide or alcohol, which can slow healing. ? You may cover the cut with a thin layer of petroleum jelly, such as Vaseline, and a nonstick bandage. ? Apply more petroleum jelly and replace the bandage as needed. ? Prop up the sore hand on a pillow anytime you sit or lie down during the next 3 days. Try to keep it above the level of your heart. This will help reduce swelling. ? Avoid any activity that could cause your cut to reopen. ? Do not remove the stitches on your own. Your doctor will tell you when to come back to have the stitches removed. · Be safe with medicines. Take pain medicines exactly as directed. ? If the doctor gave you a prescription medicine for pain, take it as prescribed. ? If you are not taking a prescription pain medicine, ask your doctor if you can take an over-the-counter medicine. · If your doctor prescribed antibiotics, take them as directed. Do not stop taking them just because you feel better. You need to take the full course of antibiotics. When should you call for help? Call your doctor now or seek immediate medical care if: 
  · You have new pain, or your pain gets worse.  
  · The skin near the wound is cool or pale or changes color.  
  · The wound starts to bleed, and blood soaks through the bandage. Oozing small amounts of blood is normal.  
  · Your wound comes open.  
  · You have symptoms of infection, such as: 
? Increased pain, swelling, warmth, or redness near the wound. ? Red streaks leading from the wound. ? Pus draining from the wound. ? A fever.  
 Watch closely for changes in your health, and be sure to contact your doctor if: 
  · You do not get better as expected. Where can you learn more? Go to http://erickson-denny.info/. Enter 525 [id number] in the search box to learn more about \"Fingertip Amputation: Care Instructions. \" Current as of: November 29, 2017 Content Version: 11.8 © 6475-2571 [employer].  Care instructions adapted under license by [address] (which disclaims liability or warranty for this information). If you have questions about a medical condition or this instruction, always ask your healthcare professional. [address] any warranty or liability for your use of this information.

## 2018-12-11 NOTE — PROGRESS NOTES
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 Worker's Compensation and legal considerations: none filed. Vitals:  
 18 1050 BP: (!) 148/105 Pulse: 70 Resp: 18 Temp: 98.4 °F (36.9 °C) TempSrc: Oral  
SpO2: 98% Weight: 220 lb 9.6 oz (100.1 kg) Height: 5' 5\" (1.651 m) PainSc:   6 PainLoc: Hand Chief Complaint Patient presents with  
 Hand Pain Left pinky finger HPI: Patient comes in today after injuring her left small finger while dropping part of the sectional on it. She was seen in the [facility] emergency department and had her wound dressed. She denies any other injuries at the time. Date of onset:  2018 Injury: Yes: Comment: Crush Prior Treatment:  Yes: Comment: [facility] emergency department Numbness/ Tingling: Yes: Comment: She reports some numbness in the finger last night that has since resolved ROS: Review of Systems - General ROS: negative Respiratory ROS: no cough, shortness of breath, or wheezing Cardiovascular ROS: no chest pain or dyspnea on exertion Musculoskeletal ROS: positive for - pain in hand - left Neurological ROS: negative Dermatological ROS: negative Past Medical History:  
Diagnosis Date  Dyslipidemia  FHx: heart disease  Goiter   
 negative biopsy Dr Jeffry Qiu  Hypertension  Hypothyroidism Dr Jeffry Qiu in past  
 IFG (impaired fasting glucose) 2014  Iron deficiency anemia   
 from menorrhagia s/p iv fe multiple times Dr Samia Graves  Morbid obesity ([facility] [address]   
 s/p lap band 9/10 Dr Ludin Torres  Polycystic ovary syndrome Past Surgical History:  
Procedure Laterality Date 425  [facility] GASTRIC BAND   Dr Ludin Torres, peak weight 251, renae 171  DELIVERY   2017  HX APPENDECTOMY  HX BREAST AUGMENTATION    
 HX CHOLECYSTECTOMY  HX DILATION AND CURETTAGE    
 HX DILATION AND CURETTAGE    
 HX GI  2010 Barium swallow, upper gastrointestinal  
 HX GI  06/28/2010 Lap Band placement  HX GYN    
 failed IVF x2 Dr Francia Vieira Current Outpatient Medications Medication Sig Dispense Refill  ibuprofen (MOTRIN) 600 mg tablet Take  by mouth every six (6) hours as needed for Pain.  oxyCODONE-acetaminophen (PERCOCET) 5-325 mg per tablet Take 1 Tab by mouth every four (4) hours as needed for Pain. Max Daily Amount: 6 Tabs. 10 Tab 0  cephALEXin (KEFLEX) 500 mg capsule Take 1 Cap by mouth four (4) times daily for 7 days. 28 Cap 0  
 hydroCHLOROthiazide (HYDRODIURIL) 12.5 mg tablet Take 12.5 mg by mouth daily.  levothyroxine (SYNTHROID) 112 mcg tablet Take 1 tablet by mouth Daily (before breakfast). (Patient taking differently: Take 125 mcg by mouth Daily (before breakfast). ) 90 tablet 3  
 MULTIVITAMIN PO Take  by mouth.  CHOLECALCIFEROL, VITAMIN D3, (VITAMIN D3 PO) Take  by mouth. Allergies Allergen Reactions  Latex Rash  Adhesive Rash  Sulfa (Sulfonamide Antibiotics) Unable to Obtain PE: There is a dorsal oblique partial tip amputation of the left small finger. There is no gross bone exposed on exam.  The nail bed is macerated and mostly gone. There is no evidence of residual nail plate. There is no active bleeding. Sensation is grossly intact on the volar aspect. Imaging: Plain films shows a distal tuft fracture of the left small finger distal phalanx. On plain films there is no bony extrusion out of the soft tissues. There is soft tissue deficit on the dorsal aspect of the distal left small finger. ICD-10-CM ICD-9-CM 1. Fingertip amputation, initial encounter S68.129A 886.0 2. Laceration of left little finger, foreign body presence unspecified, nail damage status unspecified, initial encounter S61.217A 883.0 Plan:  At this point it appears that there was a nail plate and nail bed avulsion of her left small finger. There is no visible laceration through the nail bed however there is significant maceration of the nailbed. The eponychial fold is intact however there does not appear to be any nail plate remaining. I discussed with the patient that taking her to the OR for revision will not likely improve her nail bed as it is significantly macerated. I told her that her nail plate may grow back however it will be irregular with the irregularity of her nail bed. At this point I would like to treat her in a soft dressing and see her back in 2 weeks for dressing changes with. I told her that in the future if the nail grows irregularly or is painful we can always go in for revision at that point. Plan was reviewed with patient, who verbalized agreement and understanding of the plan

## 2018-12-26 ENCOUNTER — OFFICE VISIT (OUTPATIENT)
Dept: ORTHOPEDIC SURGERY | Facility: CLINIC | Age: 38
End: 2018-12-26

## 2018-12-26 VITALS
HEIGHT: 65 IN | SYSTOLIC BLOOD PRESSURE: 138 MMHG | TEMPERATURE: 96.1 F | OXYGEN SATURATION: 100 % | RESPIRATION RATE: 16 BRPM | HEART RATE: 71 BPM | BODY MASS INDEX: 36.65 KG/M2 | DIASTOLIC BLOOD PRESSURE: 99 MMHG | WEIGHT: 220 LBS

## 2018-12-26 DIAGNOSIS — S68.119D FINGERTIP AMPUTATION, SUBSEQUENT ENCOUNTER: Primary | ICD-10-CM

## 2018-12-26 NOTE — PROGRESS NOTES
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 Worker's Compensation and legal considerations: none filed. Vitals:    12/26/18 0825   BP: (!) 138/99   Pulse: 71   Resp: 16   Temp: 96.1 °F (35.6 °C)   TempSrc: Oral   SpO2: 100%   Weight: 220 lb (99.8 kg)   Height: 5' 5\" (1.651 m)   PainSc:   2   PainLoc: Finger           Chief Complaint   Patient presents with    Follow-up     Lt pinky f/u     HPI: Patient comes in today for follow-up 2-1/2 weeks status post left small finger crush injury. Last time I saw her she was placed into a soft dressing and she was on a course of antibiotics. She reported taking off the dressing because she felt it was too tight at the base. She had a blister formed that has since started healing. She reports some continued tenderness in the tip of the finger. She has been putting Neosporin ointment on the wound and changing the bandage daily. She denies any drainage or any other signs of infection. Previous HPI: Patient comes in today after injuring her left small finger while dropping part of the sectional on it. She was seen in the [facility] emergency department and had her wound dressed. She denies any other injuries at the time.     Date of onset:  12/9/2018    Injury: Yes: Comment: Crush    Prior Treatment:  Yes: Comment: [facility] emergency department    Numbness/ Tingling: Yes: Comment: She reports some numbness in the finger last night that has since resolved    ROS: Review of Systems - General ROS: negative  Respiratory ROS: no cough, shortness of breath, or wheezing  Cardiovascular ROS: no chest pain or dyspnea on exertion  Musculoskeletal ROS: positive for - pain in hand - left  Neurological ROS: negative  Dermatological ROS: negative    Past Medical History:   Diagnosis Date    Dyslipidemia     FHx: heart disease     Goiter 7/11    negative biopsy Dr Logan Bernheim Hypertension     Hypothyroidism     Dr Citlaly Acevedo in past    IFG (impaired fasting glucose) 2014    Iron deficiency anemia     from menorrhagia s/p iv fe multiple times Dr Jared Vasques obesity [facility])     s/p lap band 9/10 Dr Gomez Large Polycystic ovary syndrome        Past Surgical History:   Procedure Laterality Date    ADJUSTMENT GASTRIC BAND      Dr Ashok Hdez, peak weight 251, renae 171    DELIVERY   2017    HX APPENDECTOMY      HX BREAST AUGMENTATION      HX CHOLECYSTECTOMY      HX DILATION AND CURETTAGE      HX DILATION AND CURETTAGE      HX GI  2010    Barium swallow, upper gastrointestinal    HX GI  2010    Lap Band placement    HX GYN      failed IVF x2 Dr Guero Singer       Current Outpatient Medications   Medication Sig Dispense Refill    ibuprofen (MOTRIN) 600 mg tablet Take  by mouth every six (6) hours as needed for Pain.  hydroCHLOROthiazide (HYDRODIURIL) 12.5 mg tablet Take 12.5 mg by mouth daily.  levothyroxine (SYNTHROID) 112 mcg tablet Take 1 tablet by mouth Daily (before breakfast). (Patient taking differently: Take 125 mcg by mouth Daily (before breakfast). ) 90 tablet 3    MULTIVITAMIN PO Take  by mouth.  oxyCODONE-acetaminophen (PERCOCET) 5-325 mg per tablet Take 1 Tab by mouth every four (4) hours as needed for Pain. Max Daily Amount: 6 Tabs. 10 Tab 0    CHOLECALCIFEROL, VITAMIN D3, (VITAMIN D3 PO) Take  by mouth. Allergies   Allergen Reactions    Latex Rash    Adhesive Rash     Steri strips only    Sulfa (Sulfonamide Antibiotics) Unable to Obtain         PE:     Left Hand: Small finger tip amputation with some early granulation tissue. The tip is moist secondary to the ointment that has been applied. There is no purulent drainage or erythema about the tip. There is some evidence of a proximal nail plate. Sensation is intact distally. Cap refill is less than 2 seconds. Imaging: Plain films shows a distal tuft fracture of the left small finger distal phalanx.   On plain films there is no bony extrusion out of the soft tissues. There is soft tissue deficit on the dorsal aspect of the distal left small finger. ICD-10-CM ICD-9-CM    1. Fingertip amputation, subsequent encounter S68.129D V54.89        Plan:     I told her to keep the wound clean and dry. I told her to wash it with soap and water daily and she may cover it with a Band-Aid after this. I told her to stop using any ointment on the finger to keep it dry. I have also informed her that as the nail grows back it may grow back to regular and she may have a propensity for hangnail which she will have to trim. I told her that in the future if the nail grows irregularly or is painful we can always go in for revision at that point.     Follow-up in 4 weeks    Plan was reviewed with patient, who verbalized agreement and understanding of the plan

## 2020-09-05 NOTE — PROGRESS NOTES
Problem: Falls - Risk of:  Goal: Will remain free from falls  Description: Will remain free from falls  9/5/2020 1759 by Claudia Radish  Outcome: Ongoing  9/5/2020 1759 by Claudia Radish  Outcome: Ongoing  Goal: Absence of physical injury  Description: Absence of physical injury  9/5/2020 1759 by Claudia Radish  Outcome: Ongoing  9/5/2020 1759 by Claudia Radish  Outcome: Ongoing     Problem: Skin Integrity:  Goal: Will show no infection signs and symptoms  Description: Will show no infection signs and symptoms  9/5/2020 1759 by Claudia Radish  Outcome: Ongoing  9/5/2020 1759 by Claudia Radish  Outcome: Ongoing  Goal: Absence of new skin breakdown  Description: Absence of new skin breakdown  9/5/2020 1759 by Claudia Radish  Outcome: Ongoing  9/5/2020 1759 by Claudia Radish  Outcome: Ongoing     Problem: Pain:  Goal: Pain level will decrease  Description: Pain level will decrease  9/5/2020 1759 by Claudia Radish  Outcome: Ongoing  9/5/2020 1759 by Claudia Radish  Outcome: Ongoing  Goal: Control of acute pain  Description: Control of acute pain  9/5/2020 1759 by Claudia Radish  Outcome: Ongoing  9/5/2020 1759 by Claudia Radish  Outcome: Ongoing  Goal: Control of chronic pain  Description: Control of chronic pain  9/5/2020 1759 by Claudia Radish  Outcome: Ongoing  9/5/2020 1759 by Claudia Radish  Outcome: Ongoing     Problem: Nutrition  Goal: Optimal nutrition therapy  Description: Nutrition Problem #1: Inadequate oral intake  Intervention: Food and/or Nutrient Delivery: Start Oral Diet  Nutritional Goals: initiation of nutrition within 72 hours     9/5/2020 1759 by Claudia Radish  Outcome: Ongoing  9/5/2020 1759 by Claudia Radish  Outcome: Ongoing     Problem: Infection - Surgical Site:  Goal: Will show no infection signs and symptoms  Description: Will show no infection signs and symptoms  9/5/2020 1759 by Claudia Radish  Outcome: Ongoing  9/5/2020 1759 by Claudia Radish  Outcome: Ongoing Subjective:      Luis Manuel Elkins is a 44 y.o. female is now {CARD CP SECERITY:93535} {days/wks/mos/yrs:517377} status post {BARIATRIC SURGERY NQD:07055}. Doing well overall. Currently on a *** diet {with_without:5700::\"without\"} difficulty. Taking in ***oz water daily. Sources of protein include ***.  *** min of activity {NUMBERS 1 - 7:05075} days a week, including ***. Bowel movements are {constipation:15194::\"regular\"}. {PAIN CONTROL:40376::\"The patient is not having any pain. \"}. The patient {IS/IS NOT:13501} compliant with multivitamins, calcium, Vit D and B12 supplements. Weight Loss Metrics 4/12/2017 7/15/2016 11/20/2015 9/2/2014 8/22/2014 6/27/2014 8/3/2012   Pre op / Initial Wt - - - - - - 240   Today's Wt 222 lb 173 lb 169 lb 197 lb 8 oz 194 lb 198 lb -   BMI 36.94 kg/m2 28.79 kg/m2 27.92 kg/m2 32.63 kg/m2 32.28 kg/m2 32.95 kg/m2 -   Ideal Body Wt - - - - - - 134   Excess Body Wt - - - - - - 106   Goal Wt - - - - - - 155   Wt loss to date - - - - - - 49   % Wt Loss - - - - - - 0.58   80% EBW - - - - - - 84.8       Body mass index is 36.94 kg/(m^2).     Comorbidities:    Hypertension: {sx desc:19890}  Diabetes: {sx desc:19890}  Obstructive Sleep Apnea: {sx desc:19890}  Hyperlipidemia: {sx desc:19890}  Stress Urinary Incontinence: {sx desc:19890}  Gastroesophageal Reflux: {sx desc:19890}  Weight related arthropathy:{sx desc:19890}        Past Medical History:   Diagnosis Date    Dyslipidemia     FHx: heart disease     Goiter 7/11    negative biopsy Dr Abelardo Hercules Hypertension     Hypothyroidism     Dr Messi Carvajal in past    IFG (impaired fasting glucose) 9/1/2014    Iron deficiency anemia     from menorrhagia s/p iv fe multiple times Dr Rg Or obesity [facility])     s/p lap band 9/10 Dr Rl Ayers Polycystic ovary syndrome        Past Surgical History:   Procedure Laterality Date    ADJUSTMENT GASTRIC BAND  2009    Dr Simón Bush, peak weight 251, renae 171    HX APPENDECTOMY      HX BREAST AUGMENTATION      HX CHOLECYSTECTOMY      HX DILATION AND CURETTAGE      HX DILATION AND CURETTAGE      HX GI  09/09/2010    Barium swallow, upper gastrointestinal    HX GI  06/28/2010    Lap Band placement    HX GYN      failed IVF x2 Dr Victor M Ann       Current Outpatient Prescriptions   Medication Sig Dispense Refill    CHOLECALCIFEROL, VITAMIN D3, (VITAMIN D3 PO) Take  by mouth.  ferrous sulfate 325 mg (65 mg iron) tablet Take  by mouth Daily (before breakfast).  levothyroxine (SYNTHROID) 112 mcg tablet Take 1 tablet by mouth Daily (before breakfast). 90 tablet 3    folic acid 922 mcg tablet Take 400 mcg by mouth daily.  MULTIVITAMIN PO Take  by mouth. Allergies   Allergen Reactions    Latex Rash    Adhesive Rash    Sulfa (Sulfonamide Antibiotics) Unable to Obtain         Objective:     Visit Vitals    /80    Pulse 84    Temp 97.7 °F (36.5 °C)    Ht 5' 5\" (1.651 m)    Wt 100.7 kg (222 lb)    BMI 36.94 kg/m2       General:  {gen appearance:25794::\"alert\",\"cooperative\",\"no distress\",\"appears stated age\"}   Chest: {Pulmonary normals:21209::\"no accessory muscle use\"}   Cor:   {Exam; heart brief:32280}   Abdomen: {POST OP ABD EXAM:13059::\"soft\",\"bowel sounds active\",\"non-tender\"}   Incisions:   {INCISION:04708::\"no dehiscence\",\"incision well approximated\",\"healing well\",\"no drainage\",\"no erythema\",\"no hernia\",\"no seroma\",\"no swelling\"}       Labs: ***    Assessment:     {DOING WELL:28460::\"Doing well postoperatively. \"}    Plan:     {plan:38289}  Encouraged to attend support group  Follow up in {NUMBERS 0-12:41626} {days/wks/mos/yrs:777372}

## 2023-04-04 NOTE — ED TRIAGE NOTES
Pt c/o crushing left pinky this AM while moving furniture. Pt with nail bed bleeding. Small dressing applied.
Universal Safety Interventions